# Patient Record
Sex: MALE | Race: WHITE | Employment: STUDENT | URBAN - METROPOLITAN AREA
[De-identification: names, ages, dates, MRNs, and addresses within clinical notes are randomized per-mention and may not be internally consistent; named-entity substitution may affect disease eponyms.]

---
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
## 2024-08-15 NOTE — PROGRESS NOTES
[facility]  Department of Family Medicine  Family Medicine Residency Program      Patient: Denise Draper 18 y.o. male     Date of Service: 8/15/24        Chief complaint:   Chief Complaint   Patient presents with    New Patient    Dizziness     Started on Monday- not feeling well this past week   Yesterday had SOB and Chest pain    Pharyngitis     X 1 day       HISTORY OF PRESENTING ILLNESS     18 y.o. male is a new patient with no PMHx who presents to the clinic to establish care. Today, they are also complaining of generalized illness.    Generalized illness  -4 days ago, got dizzy like he was going to pass out; does nto think it was chemicals because he wears a respirator; lasted 10 mintues  -He was shaking when this occurred, got anxious; left wor  -Vomited twice earlier this week; mucous/clear  -Wednesday slept most the da  -Yesterday had some chest pain, took Rajesh  -Has been SOB since  -Sore throat yesterday  -No F/C, eating okay, no diarrhea/constipation  -No dietary or workout regimens  -He has not traveled recently  -Unsure if he had sick contacts  -Tried some of ibuprofen and Rajesh, both were helpful  -No major stressors  -Sometimes wakes up to urinate at night    Health maintenance:  -Covid vaccine: No  -Flu vaccine: Yes  -Tdap: Believes he got it when he was 15  -Hep C screening: n/a  -HIV screening: n/a  -Lipid screening: n/a  -DM screening (A1c): n/a  -Depression screening: PHQ2 = 0    SUBS Screen (08/16/2024)  -No/low risk x4        Health Maintenance:  Health Maintenance Due   Topic Date Due    Depression Screen  Never done    Varicella vaccine (1 of 2 - 13+ 2-dose series) Never done    HPV vaccine (1 - Male 3-dose series) Never done    HIV screen  Never done    Hepatitis C screen  Never done    COVID-19 Vaccine (1 - 2023-24 season) Never done    Hepatitis B vaccine (1 of 3 - 19+ 3-dose series) Never done    DTaP/Tdap/Td vaccine (1 - Tdap) Never done    Flu vaccine (1)

## 2024-08-16 ENCOUNTER — OFFICE VISIT (OUTPATIENT)
Dept: FAMILY MEDICINE CLINIC | Age: 19
End: 2024-08-16

## 2024-08-16 VITALS
WEIGHT: 149.91 LBS | RESPIRATION RATE: 18 BRPM | HEIGHT: 69 IN | OXYGEN SATURATION: 98 % | TEMPERATURE: 98 F | BODY MASS INDEX: 22.2 KG/M2 | HEART RATE: 118 BPM | DIASTOLIC BLOOD PRESSURE: 83 MMHG | SYSTOLIC BLOOD PRESSURE: 127 MMHG

## 2024-08-16 DIAGNOSIS — J02.9 SORE THROAT: ICD-10-CM

## 2024-08-16 DIAGNOSIS — R42 DIZZINESS: Primary | ICD-10-CM

## 2024-08-16 DIAGNOSIS — J06.9 VIRAL URI: ICD-10-CM

## 2024-08-16 LAB
ALBUMIN: 4.1 G/DL (ref 3.5–5.2)
ALP BLD-CCNC: 76 U/L (ref 40–129)
ALT SERPL-CCNC: 10 U/L (ref 0–40)
ANION GAP SERPL CALCULATED.3IONS-SCNC: 10 MMOL/L (ref 7–16)
AST SERPL-CCNC: 21 U/L (ref 0–39)
BILIRUB SERPL-MCNC: 0.3 MG/DL (ref 0–1.2)
BUN BLDV-MCNC: 10 MG/DL (ref 6–20)
CALCIUM SERPL-MCNC: 9.2 MG/DL (ref 8.6–10.2)
CHLORIDE BLD-SCNC: 104 MMOL/L (ref 98–107)
CO2: 23 MMOL/L (ref 22–29)
CREAT SERPL-MCNC: 1 MG/DL (ref 0.7–1.2)
GFR, ESTIMATED: >90 ML/MIN/1.73M2
GLUCOSE BLD-MCNC: 74 MG/DL (ref 74–99)
HCT VFR BLD CALC: 48.4 % (ref 37–54)
HEMOGLOBIN: 14.6 G/DL (ref 12.5–16.5)
MCH RBC QN AUTO: 22 PG (ref 26–35)
MCHC RBC AUTO-ENTMCNC: 30.2 G/DL (ref 32–34.5)
MCV RBC AUTO: 72.9 FL (ref 80–99.9)
PDW BLD-RTO: 18 % (ref 11.5–15)
PLATELET, FLUORESCENCE: 176 K/UL (ref 130–450)
PMV BLD AUTO: ABNORMAL FL (ref 7–12)
POTASSIUM SERPL-SCNC: 4.3 MMOL/L (ref 3.5–5)
RBC # BLD: 6.64 M/UL (ref 3.8–5.8)
SODIUM BLD-SCNC: 137 MMOL/L (ref 132–146)
TOTAL PROTEIN: 7.4 G/DL (ref 6.4–8.3)
WBC # BLD: 4.6 K/UL (ref 4.5–11.5)

## 2024-08-16 SDOH — ECONOMIC STABILITY: FOOD INSECURITY: WITHIN THE PAST 12 MONTHS, THE FOOD YOU BOUGHT JUST DIDN'T LAST AND YOU DIDN'T HAVE MONEY TO GET MORE.: NEVER TRUE

## 2024-08-16 SDOH — ECONOMIC STABILITY: FOOD INSECURITY: WITHIN THE PAST 12 MONTHS, YOU WORRIED THAT YOUR FOOD WOULD RUN OUT BEFORE YOU GOT MONEY TO BUY MORE.: NEVER TRUE

## 2024-08-16 SDOH — ECONOMIC STABILITY: INCOME INSECURITY: HOW HARD IS IT FOR YOU TO PAY FOR THE VERY BASICS LIKE FOOD, HOUSING, MEDICAL CARE, AND HEATING?: NOT HARD AT ALL

## 2024-08-16 ASSESSMENT — PATIENT HEALTH QUESTIONNAIRE - PHQ9
SUM OF ALL RESPONSES TO PHQ QUESTIONS 1-9: 0
1. LITTLE INTEREST OR PLEASURE IN DOING THINGS: NOT AT ALL
SUM OF ALL RESPONSES TO PHQ QUESTIONS 1-9: 0
SUM OF ALL RESPONSES TO PHQ9 QUESTIONS 1 & 2: 0
SUM OF ALL RESPONSES TO PHQ QUESTIONS 1-9: 0
SUM OF ALL RESPONSES TO PHQ QUESTIONS 1-9: 0
2. FEELING DOWN, DEPRESSED OR HOPELESS: NOT AT ALL

## 2024-08-16 ASSESSMENT — ENCOUNTER SYMPTOMS
CONSTIPATION: 0
RHINORRHEA: 0
VOMITING: 0
COUGH: 0
NAUSEA: 0
SHORTNESS OF BREATH: 0
SORE THROAT: 1
DIARRHEA: 0
ABDOMINAL PAIN: 0

## 2024-08-16 NOTE — PROGRESS NOTES
[facility]  Precepting Note    Subjective:  New patient    1 week of dizziness on standing  Lasted about 10 minutes  Felt shaky but issue went away within 10 minutes  Progressed ot have emesis, myalgias, arthralgias   No fevers     Thinks had TdaP in HS  Declines HM issues otherwise     ROS otherwise negative     Past medical, surgical, family and social history were reviewed, non-contributory, and unchanged unless otherwise stated.    Objective:    /78   Pulse 93   Temp 98 °F (36.7 °C)   Resp 18   Ht 1.753 m (5' 9\")   Wt 68 kg (149 lb 14.6 oz)   SpO2 98%   BMI 22.14 kg/m²     Exam is as noted by resident with the following changes, additions or corrections:    General:  NAD; alert & oriented x 3   Eye: PEERL  ENT: TM's clear; oropharynx unremarkable   Heart:  RRR, no murmurs, gallops, or rubs.  Lungs:  CTA bilaterally, no wheeze, rales or rhonchi  Abd: bowel sounds present, nontender, nondistended, no masses  Extrem:  No clubbing, cyanosis, or edema    Assessment/Plan:  Viral syndrome   R/o strep   Check glucose   Follow clinically      Attending Physician Statement  I have reviewed the chart, including any radiology or labs. I have discussed the case, including pertinent history and exam findings with the resident.  I agree with the assessment, plan and orders as documented by the resident.  Please refer to the resident note for additional information.      Electronically signed by Eliseo Wang MD on 8/16/2024 at 9:11 AM

## 2024-08-16 NOTE — PROGRESS NOTES
This patient was screened with SUBS screening tool.   On 8/16/2024 the patient has a Negative Screen

## 2024-08-20 DIAGNOSIS — R71.8 RBC MICROCYTOSIS: Primary | ICD-10-CM

## 2024-09-13 ENCOUNTER — HOSPITAL ENCOUNTER (EMERGENCY)
Age: 19
Discharge: LEFT BEFORE DISCHARGE | End: 2024-09-13

## 2024-09-13 VITALS — HEART RATE: 105 BPM | TEMPERATURE: 97.7 F | RESPIRATION RATE: 18 BRPM | OXYGEN SATURATION: 97 %

## 2024-10-15 ENCOUNTER — HOSPITAL ENCOUNTER (OUTPATIENT)
Age: 19
Discharge: HOME | End: 2024-10-15
Payer: COMMERCIAL

## 2024-10-15 DIAGNOSIS — R71.8 RBC MICROCYTOSIS: ICD-10-CM

## 2024-10-15 LAB
ERYTHROCYTE [DISTWIDTH] IN BLOOD BY AUTOMATED COUNT: 15.6 % (ref 11.5–15)
FERRITIN SERPL-MCNC: 9 NG/ML
FOLATE SERPL-MCNC: 3.8 NG/ML (ref 4.8–24.2)
HCT VFR BLD AUTO: 49.4 % (ref 37–54)
HGB BLD-MCNC: 15.8 G/DL (ref 12.5–16.5)
IMM RETICS NFR: 13.5 % (ref 2.3–13.4)
IRON SATN MFR SERPL: 9 % (ref 20–55)
IRON SERPL-MCNC: 36 UG/DL (ref 59–158)
MCH RBC QN AUTO: 22.5 PG (ref 26–35)
MCHC RBC AUTO-ENTMCNC: 32 G/DL (ref 32–34.5)
MCV RBC AUTO: 70.4 FL (ref 80–99.9)
PLATELET # BLD AUTO: 224 K/UL (ref 130–450)
PMV BLD AUTO: 10.4 FL (ref 7–12)
RBC # BLD AUTO: 7.02 M/UL (ref 3.8–5.8)
RETIC HEMOGLOBIN: 26 PG (ref 28.2–36.6)
RETICS # AUTO: 0.09 M/UL
RETICS/RBC NFR AUTO: 1.4 % (ref 0.4–1.9)
TIBC SERPL-MCNC: 384 UG/DL (ref 250–450)
VIT B12 SERPL-MCNC: 970 PG/ML (ref 211–946)
WBC OTHER # BLD: 5.6 K/UL (ref 4.5–11.5)

## 2024-10-15 PROCEDURE — 85045 AUTOMATED RETICULOCYTE COUNT: CPT

## 2024-10-15 PROCEDURE — 85027 COMPLETE CBC AUTOMATED: CPT

## 2024-10-15 PROCEDURE — 83550 IRON BINDING TEST: CPT

## 2024-10-15 PROCEDURE — 83540 ASSAY OF IRON: CPT

## 2024-10-15 PROCEDURE — 83020 HEMOGLOBIN ELECTROPHORESIS: CPT

## 2024-10-15 PROCEDURE — 36415 COLL VENOUS BLD VENIPUNCTURE: CPT

## 2024-10-15 PROCEDURE — 82728 ASSAY OF FERRITIN: CPT

## 2024-10-15 PROCEDURE — 82607 VITAMIN B-12: CPT

## 2024-10-15 PROCEDURE — 82746 ASSAY OF FOLIC ACID SERUM: CPT

## 2024-10-16 DIAGNOSIS — D50.9 MICROCYTIC ANEMIA: Primary | ICD-10-CM

## 2024-10-16 LAB — PATH REV BLD -IMP: NORMAL

## 2024-10-16 RX ORDER — ASCORBIC ACID 500 MG
500 TABLET ORAL DAILY
Qty: 30 TABLET | Refills: 3 | Status: SHIPPED | OUTPATIENT
Start: 2024-10-16

## 2024-10-16 RX ORDER — FERROUS SULFATE 325(65) MG
325 TABLET ORAL
Qty: 90 TABLET | Refills: 1 | Status: SHIPPED | OUTPATIENT
Start: 2024-10-16

## 2024-10-17 ENCOUNTER — TELEPHONE (OUTPATIENT)
Dept: INFUSION THERAPY | Age: 19
End: 2024-10-17

## 2024-10-21 LAB
HGB ELECTROPHORESIS INTERP: NORMAL
PATHOLOGIST: NORMAL

## 2024-11-08 ENCOUNTER — OFFICE VISIT (OUTPATIENT)
Dept: ONCOLOGY | Age: 19
End: 2024-11-08

## 2024-11-08 ENCOUNTER — HOSPITAL ENCOUNTER (OUTPATIENT)
Dept: INFUSION THERAPY | Age: 19
Discharge: HOME | End: 2024-11-08
Payer: COMMERCIAL

## 2024-11-08 VITALS
HEART RATE: 73 BPM | WEIGHT: 150.5 LBS | TEMPERATURE: 97.9 F | DIASTOLIC BLOOD PRESSURE: 78 MMHG | SYSTOLIC BLOOD PRESSURE: 124 MMHG | RESPIRATION RATE: 20 BRPM | OXYGEN SATURATION: 98 % | BODY MASS INDEX: 22.22 KG/M2

## 2024-11-08 DIAGNOSIS — D50.8 OTHER IRON DEFICIENCY ANEMIA: ICD-10-CM

## 2024-11-08 DIAGNOSIS — R71.8 MICROCYTOSIS: ICD-10-CM

## 2024-11-08 DIAGNOSIS — D50.8 OTHER IRON DEFICIENCY ANEMIA: Primary | ICD-10-CM

## 2024-11-08 DIAGNOSIS — J35.1 ENLARGED TONSILS: ICD-10-CM

## 2024-11-08 LAB
ALBUMIN SERPL-MCNC: 4.5 G/DL (ref 3.5–5.2)
ALP SERPL-CCNC: 90 U/L (ref 40–129)
ALT SERPL-CCNC: 16 U/L (ref 0–40)
ANION GAP SERPL CALCULATED.3IONS-SCNC: 9 MMOL/L (ref 7–16)
AST SERPL-CCNC: 27 U/L (ref 0–39)
BASOPHILS # BLD: 0.04 K/UL (ref 0–0.2)
BASOPHILS NFR BLD: 1 % (ref 0–2)
BILIRUB SERPL-MCNC: 0.8 MG/DL (ref 0–1.2)
BUN SERPL-MCNC: 14 MG/DL (ref 6–20)
CALCIUM SERPL-MCNC: 9.5 MG/DL (ref 8.6–10.2)
CHLORIDE SERPL-SCNC: 105 MMOL/L (ref 98–107)
CO2 SERPL-SCNC: 27 MMOL/L (ref 22–29)
CREAT SERPL-MCNC: 0.9 MG/DL (ref 0.7–1.2)
EOSINOPHIL # BLD: 0.18 K/UL (ref 0.05–0.5)
EOSINOPHILS RELATIVE PERCENT: 5 % (ref 0–6)
ERYTHROCYTE [DISTWIDTH] IN BLOOD BY AUTOMATED COUNT: 17.6 % (ref 11.5–15)
FERRITIN SERPL-MCNC: 30 NG/ML
FOLATE SERPL-MCNC: 10.4 NG/ML (ref 4.8–24.2)
GFR, ESTIMATED: >90 ML/MIN/1.73M2
GLIADIN IGA SER IA-ACNC: NORMAL U/ML
GLIADIN IGG SER IA-ACNC: NORMAL U/ML
GLUCOSE SERPL-MCNC: 62 MG/DL (ref 74–99)
HCT VFR BLD AUTO: 51 % (ref 37–54)
HGB BLD-MCNC: 16.2 G/DL (ref 12.5–16.5)
IGA SERPL-MCNC: 273 MG/DL (ref 70–400)
IMM GRANULOCYTES # BLD AUTO: <0.03 K/UL (ref 0–0.58)
IMM GRANULOCYTES NFR BLD: 0 % (ref 0–5)
IRON SATN MFR SERPL: 16 % (ref 20–55)
IRON SERPL-MCNC: 66 UG/DL (ref 59–158)
LYMPHOCYTES NFR BLD: 1.41 K/UL (ref 1.5–4)
LYMPHOCYTES RELATIVE PERCENT: 39 % (ref 20–42)
MCH RBC QN AUTO: 23.5 PG (ref 26–35)
MCHC RBC AUTO-ENTMCNC: 31.8 G/DL (ref 32–34.5)
MCV RBC AUTO: 74.1 FL (ref 80–99.9)
MONOCYTES NFR BLD: 0.34 K/UL (ref 0.1–0.95)
MONOCYTES NFR BLD: 9 % (ref 2–12)
NEUTROPHILS NFR BLD: 46 % (ref 43–80)
NEUTS SEG NFR BLD: 1.66 K/UL (ref 1.8–7.3)
PLATELET # BLD AUTO: 217 K/UL (ref 130–450)
PMV BLD AUTO: 10.3 FL (ref 7–12)
POTASSIUM SERPL-SCNC: 3.9 MMOL/L (ref 3.5–5)
PROT SERPL-MCNC: 7.5 G/DL (ref 6.4–8.3)
RBC # BLD AUTO: 6.88 M/UL (ref 3.8–5.8)
SODIUM SERPL-SCNC: 141 MMOL/L (ref 132–146)
TIBC SERPL-MCNC: 415 UG/DL (ref 250–450)
TTG IGA SER IA-ACNC: NORMAL U/ML
VIT B12 SERPL-MCNC: 1217 PG/ML (ref 211–946)
WBC OTHER # BLD: 3.6 K/UL (ref 4.5–11.5)

## 2024-11-08 PROCEDURE — 36415 COLL VENOUS BLD VENIPUNCTURE: CPT

## 2024-11-08 PROCEDURE — 82746 ASSAY OF FOLIC ACID SERUM: CPT

## 2024-11-08 PROCEDURE — 80053 COMPREHEN METABOLIC PANEL: CPT

## 2024-11-08 PROCEDURE — 82728 ASSAY OF FERRITIN: CPT

## 2024-11-08 PROCEDURE — 83516 IMMUNOASSAY NONANTIBODY: CPT

## 2024-11-08 PROCEDURE — 83540 ASSAY OF IRON: CPT

## 2024-11-08 PROCEDURE — 85025 COMPLETE CBC W/AUTO DIFF WBC: CPT

## 2024-11-08 PROCEDURE — 82607 VITAMIN B-12: CPT

## 2024-11-08 PROCEDURE — 82668 ASSAY OF ERYTHROPOIETIN: CPT

## 2024-11-08 PROCEDURE — 82784 ASSAY IGA/IGD/IGG/IGM EACH: CPT

## 2024-11-08 PROCEDURE — 83550 IRON BINDING TEST: CPT

## 2024-11-08 NOTE — PROGRESS NOTES
Denise Draper  2005 19 y.o.      Referring Physician:     PCP: Tee Carr MD    Vitals:    24 1011   BP: 124/78   Pulse: 73   Resp: 20   Temp: 97.9 °F (36.6 °C)   SpO2: 98%        Wt Readings from Last 3 Encounters:   24 68.3 kg (150 lb 8 oz)   24 68 kg (149 lb 14.6 oz) (41%, Z= -0.22)*   04/15/22 65.8 kg (145 lb) (49%, Z= -0.02)*     * Growth percentiles are based on [facility] (Boys, 2-20 Years) data.        Body mass index is 22.22 kg/m².          Chief Complaint:   Chief Complaint   Patient presents with    New Patient     Microcytic Anemia          Cancer Staging   No matching staging information was found for the patient.      Prior Radiation Therapy? NO    Concurrent Chemo/radiation? NO    Prior Chemotherapy? NO    Prior Hormonal Therapy? NO    Head and Neck Cancer? No, patient does NOT have HN cancer.      LMP: na    Age at first Menses: na    : na    Para: na          Current Outpatient Medications:     ferrous sulfate (IRON 325) 325 (65 Fe) MG tablet, Take 1 tablet by mouth daily (with breakfast) (Patient not taking: Reported on 2024), Disp: 90 tablet, Rfl: 1    vitamin C (ASCORBIC ACID) 500 MG tablet, Take 1 tablet by mouth daily (Patient not taking: Reported on 2024), Disp: 30 tablet, Rfl: 3     History reviewed. No pertinent past medical history.    Past Surgical History:   Procedure Laterality Date    APPENDECTOMY      DENTAL SURGERY  2013    FILLINGS    EXPLORATION OF UNDESCENDED TESTICLE      FRACTURE SURGERY      TYMPANOSTOMY TUBE PLACEMENT         History reviewed. No pertinent family history.    Social History     Socioeconomic History    Marital status: Single     Spouse name: Not on file    Number of children: Not on file    Years of education: Not on file    Highest education level: Not on file   Occupational History    Not on file   Tobacco Use    Smoking status: Never     Passive exposure: Never    Smokeless tobacco: Never   Substance and Sexual 
distended.   No ascites.  No hepatosplenomegaly.  EXTREMITIES: without clubbing, cyanosis, or edema.  NEUROLOGIC:  Alert, awake, oriented to time, place and person. No focal deficits.  LYMPHATICS: No cervical  lymphadenopathy.  SKIN : No Rash. No Bruising.  PSYCH: Normal mood, behavior, affect, judgement, and thought content.    ECOG PS 0      No results found.         ASSESSMENT      Elevated RBC with microcytosis, without anemia  Iron deficiency likely associate above  Folate deficiency  Morning headaches  Anxiety attacks  Enlarged tonsils      PLAN     11/08/2024: Comes in for evaluation for microcytosis.  He also has elevated RBC without anemia.  Today we had discussion regarding different causes of this.  Hemoglobin electrophoresis was checked, which was largely unimpressive.  He does have evidence of iron deficiency.  He denies of any bleeding.  Consider possible issues with diet, as he and his mother admit that he is a \"picky eater.\"  Patient does not have overt signs or symptoms suggestive of celiac disease, but I did offer screening for this.    As there is evidence of iron deficiency which appears to be significant, consider the this as likely contributory to his microcytosis.  And I did address that he may benefit from GI workup, as this degree of iron deficiency is significant for his age and gender.    Secondly regarding his elevated RBC and normal hemoglobin, consider that he may also have an underlying secondary polycythemia.  History and examination seems to suggest that he may have underlying GREGORIA.  He is unsure if he snores.  However he wakes up with headaches that improves during the day.  Oropharyngeal examination notes enlarged tonsils, in which she had seen the [facility] in the past but was not recommended any interventions.  He appears to be Mallampati 4.  Patient does not smoke. Patient and mother wish to be seen by ENT again.  Consider while his iron is being replaced, this may result into

## 2024-11-11 LAB
EPO SERPL-ACNC: 10 MU/ML (ref 4–27)
GLIADIN IGA SER IA-ACNC: 3.1 U/ML
GLIADIN IGG SER IA-ACNC: 1.1 U/ML
IGA SERPL-MCNC: 273 MG/DL (ref 70–400)
TTG IGA SER IA-ACNC: 0.6 U/ML

## 2024-11-22 ENCOUNTER — OFFICE VISIT (OUTPATIENT)
Dept: ONCOLOGY | Age: 19
End: 2024-11-22
Payer: COMMERCIAL

## 2024-11-22 ENCOUNTER — TELEPHONE (OUTPATIENT)
Dept: INFUSION THERAPY | Age: 19
End: 2024-11-22

## 2024-11-22 VITALS
HEART RATE: 80 BPM | BODY MASS INDEX: 22.84 KG/M2 | WEIGHT: 154.2 LBS | OXYGEN SATURATION: 100 % | TEMPERATURE: 99.1 F | HEIGHT: 69 IN | DIASTOLIC BLOOD PRESSURE: 75 MMHG | SYSTOLIC BLOOD PRESSURE: 129 MMHG | RESPIRATION RATE: 16 BRPM

## 2024-11-22 DIAGNOSIS — D50.8 OTHER IRON DEFICIENCY ANEMIA: Primary | ICD-10-CM

## 2024-11-22 DIAGNOSIS — E53.8 FOLATE DEFICIENCY: ICD-10-CM

## 2024-11-22 PROCEDURE — 99213 OFFICE O/P EST LOW 20 MIN: CPT | Performed by: STUDENT IN AN ORGANIZED HEALTH CARE EDUCATION/TRAINING PROGRAM

## 2024-11-22 NOTE — TELEPHONE ENCOUNTER
Spoke with patient's mother, Yen. Said RN made Yen aware that Dr. Rahman's office has reached out to schedule appt. They are waiting on return call. Dr. Rahman's office number given. Yen verbalized understanding and will call Josiha's office to schedule appt.  Ileana Shrestha, BSN, RN, OCN 11/22/24 [id number]

## 2024-11-22 NOTE — PROGRESS NOTES
[facility] MEDICAL ONCOLOGY  [address] [facility] [place] [address]  Dept: [phone]  Loc: [phone]    Sudha Pham MD   ·   MD Josephine Kaye APRN  ·  DIXON Santos        Hematology/Oncology   Clinic Progress Note              Patient: Denise Draper,  19 y.o. male    Date of Encounter: 11/22/2024     Referring Provider:  Parveen Kunz MD    Reason for Visit:     Diagnosis   D50.9 (ICD-10-CM) - Microcytic anemia           PCP:  Tee Carr MD      Chief Complaint   Patient presents with    Follow-up         Subjective:  No major issues; here to review workup.  Has reflux, which has been ongoing even before taking PO iron.      HPI from Initial Outpatient Consultation  (11/8/2024):  The patient is a 19 y.o. male comes in for evaluation for findings of microcytosis, elevated RBC with normal hemoglobin.    Patient has been worked up by PCP, which included iron studies suggestive of iron deficiency.  He also has evidence of folate deficiency.  However his hemoglobin has largely normal over recent months.  However his RBC count has been elevated at least >6, and MCV has been low, ranging from 65-72.9.    Patient denies of unintended weight loss, abdominal pain, nausea, vomiting, melena/hematochezia.  He denies family history of GI cancers.  He is accompanied by his mother today.  She describes patient being a \"picky eater\", and patient admits that he eats lots of \"junk foods.\"  Patient and his mother are unaware of family history of hematologic disorders or inherited anemias.  Patient does not have any siblings.    He does not smoke, drink alcohol, or use illicit drugs.    Patient reportedly complains of having anxiety/panic attacks over the last few months.  He also noted that he would wake up with headaches, which improved during the day.  He denies of snoring, or any diagnosis of sleep apnea.    He does report having enlarged tonsils.

## 2024-12-24 ENCOUNTER — HOSPITAL ENCOUNTER (EMERGENCY)
Age: 19
Discharge: HOME | End: 2024-12-24
Attending: EMERGENCY MEDICINE
Payer: COMMERCIAL

## 2024-12-24 ENCOUNTER — APPOINTMENT (OUTPATIENT)
Dept: GENERAL RADIOLOGY | Age: 19
End: 2024-12-24
Payer: COMMERCIAL

## 2024-12-24 VITALS
HEART RATE: 89 BPM | OXYGEN SATURATION: 98 % | DIASTOLIC BLOOD PRESSURE: 71 MMHG | RESPIRATION RATE: 20 BRPM | SYSTOLIC BLOOD PRESSURE: 129 MMHG | TEMPERATURE: 97.4 F

## 2024-12-24 DIAGNOSIS — R07.9 CHEST PAIN, UNSPECIFIED TYPE: Primary | ICD-10-CM

## 2024-12-24 PROCEDURE — 71045 X-RAY EXAM CHEST 1 VIEW: CPT

## 2024-12-24 PROCEDURE — 99284 EMERGENCY DEPT VISIT MOD MDM: CPT

## 2024-12-24 ASSESSMENT — LIFESTYLE VARIABLES
HOW MANY STANDARD DRINKS CONTAINING ALCOHOL DO YOU HAVE ON A TYPICAL DAY: PATIENT DOES NOT DRINK
HOW OFTEN DO YOU HAVE A DRINK CONTAINING ALCOHOL: NEVER

## 2024-12-25 NOTE — DISCHARGE INSTRUCTIONS
Return if increased chest pain shortness of breath heart racing weakness lightheadedness leg swelling or calf pain

## 2024-12-25 NOTE — ED PROVIDER NOTES
personally reviewed by myself   LABS:  Results for orders placed or performed during the hospital encounter of 12/24/24   EKG 12 Lead   Result Value Ref Range    Ventricular Rate 88 BPM    Atrial Rate 88 BPM    P-R Interval 120 ms    QRS Duration 92 ms    Q-T Interval 342 ms    QTc Calculation (Bazett) 413 ms    P Axis 62 degrees    R Axis 57 degrees    T Axis 48 degrees       RADIOLOGY:  Interpreted by Radiologist.  XR CHEST 1 VIEW    (Results Pending)       ------------------------- NURSING NOTES AND VITALS REVIEWED ---------------------------   The nursing notes within the ED encounter and vital signs as below have been reviewed.   /71   Pulse 89   Temp 97.4 °F (36.3 °C) (Axillary)   Resp 20   SpO2 98%   Oxygen Saturation Interpretation: Normal      ---------------------------------------------------PHYSICAL EXAM--------------------------------------    Constitutional/General: Alert and oriented x3, well appearing, non toxic in NAD  Head: NC/AT  Eyes: PERRL, EOMI  Mouth: Oropharynx clear, handling secretions, no trismus  Neck: Supple, full ROM, no meningeal signs  Pulmonary: Lungs clear to auscultation bilaterally, no wheezes, rales, or rhonchi. Not in respiratory distress  Cardiovascular:  Regular rate and rhythm, no murmurs, gallops, or rubs. 2+ distal pulses  Abdomen: Soft, non tender, non distended,   Extremities: Moves all extremities x 4. Warm and well perfused  Skin: warm and dry without rash  Neurologic: GCS 15,  Psych: Normal Affect      ------------------------------ ED COURSE/MEDICAL DECISION MAKING----------------------  Medications - No data to display      Medical Decision Making:       Denise Draper is a 19 y.o. male presenting to the ED for nonspecific sharp chest pain did not was not persistent he was not short of breath with it no nausea vomiting no diaphoresis no palpitations no heart racing no lightheadedness no syncopal episode no nausea or vomiting that he was never short of

## 2024-12-28 LAB
EKG ATRIAL RATE: 88 BPM
EKG P AXIS: 62 DEGREES
EKG P-R INTERVAL: 120 MS
EKG Q-T INTERVAL: 342 MS
EKG QRS DURATION: 92 MS
EKG QTC CALCULATION (BAZETT): 413 MS
EKG R AXIS: 57 DEGREES
EKG T AXIS: 48 DEGREES
EKG VENTRICULAR RATE: 88 BPM

## 2025-01-24 ENCOUNTER — OFFICE VISIT (OUTPATIENT)
Dept: ENT CLINIC | Age: 20
End: 2025-01-24
Payer: COMMERCIAL

## 2025-01-24 VITALS
HEART RATE: 88 BPM | SYSTOLIC BLOOD PRESSURE: 136 MMHG | BODY MASS INDEX: 23 KG/M2 | WEIGHT: 155.3 LBS | DIASTOLIC BLOOD PRESSURE: 85 MMHG | HEIGHT: 69 IN

## 2025-01-24 DIAGNOSIS — J35.1 TONSILLAR HYPERTROPHY: Primary | ICD-10-CM

## 2025-01-24 PROCEDURE — 99203 OFFICE O/P NEW LOW 30 MIN: CPT | Performed by: OTOLARYNGOLOGY

## 2025-01-24 ASSESSMENT — ENCOUNTER SYMPTOMS
STRIDOR: 0
RHINORRHEA: 0
WHEEZING: 0
SINUS PRESSURE: 0
COUGH: 0
SHORTNESS OF BREATH: 0
SINUS PAIN: 0
CHOKING: 0
SORE THROAT: 0

## 2025-01-24 NOTE — PROGRESS NOTES
Department of Otolaryngology  Office Consult Note  1/24/25          Subjective:        Chief Complaint:  had concerns including New Patient (NEW PATIENT - NP enlarge tonsil/).     Patient ID: Denise Draper is a 19 y.o. male.    HPI: Patient presents as  new patient for enlarged tonsils and gag reflex. Patient reports he has a very sensitive gag reflex that is worsened by swallowing pills and also touching the outside of his chin and neck.  This has been going on for the past few months to years.  Nothing makes it better or worse.  He has been noted to have enlarged tonsils.  He denies history of recurrent strep tonsillitis.  He states he does have tiredness but is not sure that he snores, has apnea .      Review of Systems   Constitutional: Negative.    HENT:  Negative for congestion, ear discharge, ear pain, hearing loss, rhinorrhea, sinus pressure, sinus pain, sore throat and tinnitus.    Respiratory:  Negative for cough, choking, shortness of breath, wheezing and stridor.    Cardiovascular:  Negative for chest pain.   Skin:  Negative for rash.   Allergic/Immunologic: Negative for environmental allergies and immunocompromised state.   Neurological:  Negative for dizziness, weakness, light-headedness and headaches.   Psychiatric/Behavioral:  Negative for confusion.    All other systems reviewed and are negative.        Past Medical History:   Diagnosis Date    Anemia      Past Surgical History:   Procedure Laterality Date    APPENDECTOMY      ARM SURGERY Right     DENTAL SURGERY  05/08/2013    FILLINGS    EXPLORATION OF UNDESCENDED TESTICLE      FRACTURE SURGERY      TYMPANOSTOMY TUBE PLACEMENT         Current Outpatient Medications:     Ascorbic Acid (VITAMIN C GUMMIE PO), Take by mouth, Disp: , Rfl:     ferrous sulfate (IRON 325) 325 (65 Fe) MG tablet, Take 1 tablet by mouth daily (with breakfast) (Patient taking differently: Take 1 tablet by mouth daily (with breakfast) Taking iron gummie PO), Disp: 90

## 2025-03-20 ENCOUNTER — HOSPITAL ENCOUNTER (OUTPATIENT)
Age: 20
Discharge: HOME | End: 2025-03-20
Payer: COMMERCIAL

## 2025-03-20 DIAGNOSIS — D50.8 OTHER IRON DEFICIENCY ANEMIA: ICD-10-CM

## 2025-03-20 DIAGNOSIS — E53.8 FOLATE DEFICIENCY: ICD-10-CM

## 2025-03-20 LAB
ALBUMIN SERPL-MCNC: 4.3 G/DL (ref 3.5–5.2)
ALP SERPL-CCNC: 91 U/L (ref 40–129)
ALT SERPL-CCNC: 16 U/L (ref 0–40)
ANION GAP SERPL CALCULATED.3IONS-SCNC: 11 MMOL/L (ref 7–16)
AST SERPL-CCNC: 24 U/L (ref 0–39)
BASOPHILS # BLD: 0.02 K/UL (ref 0–0.2)
BASOPHILS NFR BLD: 0 % (ref 0–2)
BILIRUB SERPL-MCNC: 0.6 MG/DL (ref 0–1.2)
BUN SERPL-MCNC: 16 MG/DL (ref 6–20)
CALCIUM SERPL-MCNC: 9.7 MG/DL (ref 8.6–10.2)
CHLORIDE SERPL-SCNC: 101 MMOL/L (ref 98–107)
CO2 SERPL-SCNC: 26 MMOL/L (ref 22–29)
CREAT SERPL-MCNC: 0.8 MG/DL (ref 0.7–1.2)
EOSINOPHIL # BLD: 0.18 K/UL (ref 0.05–0.5)
EOSINOPHILS RELATIVE PERCENT: 3 % (ref 0–6)
ERYTHROCYTE [DISTWIDTH] IN BLOOD BY AUTOMATED COUNT: 14.7 % (ref 11.5–15)
FERRITIN SERPL-MCNC: 14 NG/ML
FOLATE SERPL-MCNC: 6.1 NG/ML (ref 4.8–24.2)
GFR, ESTIMATED: >90 ML/MIN/1.73M2
GLUCOSE SERPL-MCNC: 86 MG/DL (ref 74–99)
HCT VFR BLD AUTO: 48.7 % (ref 37–54)
HGB BLD-MCNC: 15.8 G/DL (ref 12.5–16.5)
IMM GRANULOCYTES # BLD AUTO: 0.03 K/UL (ref 0–0.58)
IMM GRANULOCYTES NFR BLD: 1 % (ref 0–5)
IRON SATN MFR SERPL: 17 % (ref 20–55)
IRON SERPL-MCNC: 59 UG/DL (ref 59–158)
LYMPHOCYTES NFR BLD: 1.62 K/UL (ref 1.5–4)
LYMPHOCYTES RELATIVE PERCENT: 30 % (ref 20–42)
MCH RBC QN AUTO: 23.6 PG (ref 26–35)
MCHC RBC AUTO-ENTMCNC: 32.4 G/DL (ref 32–34.5)
MCV RBC AUTO: 72.7 FL (ref 80–99.9)
MONOCYTES NFR BLD: 0.32 K/UL (ref 0.1–0.95)
MONOCYTES NFR BLD: 6 % (ref 2–12)
NEUTROPHILS NFR BLD: 60 % (ref 43–80)
NEUTS SEG NFR BLD: 3.21 K/UL (ref 1.8–7.3)
PLATELET # BLD AUTO: 185 K/UL (ref 130–450)
PMV BLD AUTO: 9.8 FL (ref 7–12)
POTASSIUM SERPL-SCNC: 4 MMOL/L (ref 3.5–5)
PROT SERPL-MCNC: 7.6 G/DL (ref 6.4–8.3)
RBC # BLD AUTO: 6.7 M/UL (ref 3.8–5.8)
SODIUM SERPL-SCNC: 138 MMOL/L (ref 132–146)
TIBC SERPL-MCNC: 354 UG/DL (ref 250–450)
VIT B12 SERPL-MCNC: 910 PG/ML (ref 211–946)
WBC OTHER # BLD: 5.4 K/UL (ref 4.5–11.5)

## 2025-03-20 PROCEDURE — 83540 ASSAY OF IRON: CPT

## 2025-03-20 PROCEDURE — 36415 COLL VENOUS BLD VENIPUNCTURE: CPT

## 2025-03-20 PROCEDURE — 85025 COMPLETE CBC W/AUTO DIFF WBC: CPT

## 2025-03-20 PROCEDURE — 80053 COMPREHEN METABOLIC PANEL: CPT

## 2025-03-20 PROCEDURE — 82746 ASSAY OF FOLIC ACID SERUM: CPT

## 2025-03-20 PROCEDURE — 82728 ASSAY OF FERRITIN: CPT

## 2025-03-20 PROCEDURE — 82607 VITAMIN B-12: CPT

## 2025-03-20 PROCEDURE — 83550 IRON BINDING TEST: CPT

## 2025-03-21 ENCOUNTER — OFFICE VISIT (OUTPATIENT)
Dept: ONCOLOGY | Age: 20
End: 2025-03-21
Payer: COMMERCIAL

## 2025-03-21 VITALS
SYSTOLIC BLOOD PRESSURE: 158 MMHG | DIASTOLIC BLOOD PRESSURE: 80 MMHG | BODY MASS INDEX: 23.47 KG/M2 | TEMPERATURE: 98.8 F | HEIGHT: 69 IN | HEART RATE: 79 BPM | WEIGHT: 158.5 LBS | OXYGEN SATURATION: 99 %

## 2025-03-21 DIAGNOSIS — D50.8 OTHER IRON DEFICIENCY ANEMIA: Primary | ICD-10-CM

## 2025-03-21 PROCEDURE — 99213 OFFICE O/P EST LOW 20 MIN: CPT | Performed by: STUDENT IN AN ORGANIZED HEALTH CARE EDUCATION/TRAINING PROGRAM

## 2025-03-21 NOTE — PROGRESS NOTES
referrals  On multivitamin which includes iron and folate  Offered consideration for IV iron infusion  Consider referral to sleep medicine  Will await initial workup and response to PO iron and multivitamin; and if there is concern for primary hematology pathology, consider more intensive workup    11/22/2024:   Scheduled to see ENT in the next few months  Awaiting contact from GI to schedule appt  Ferritin and folate up while on supplement  Consider low iron and folate levels likely due poor nutritional due to his eating habits  With presence of tonsillar hypertrophy, consider celiac, although he does not have classic signs/symptoms of celiacs  Has reflux notably at night; pt regularly eats fried foods (ex., fried chicken, chicken nuggets)  Encouraged pt to cut down on fried foods and increase various meats, fruits and vegetables  I noted I would anticipate his Hgb/Hct may exceed normal limits in the months ahead as we replete his iron levels     03/21/2025:   Completed EGD last month  Found ulcer of distal esophagus, biopsies done in stomach as well  Will request pathology from Dr. Rahman's office  Still appears to be iron deficient but pt started on PPI  Offered IV iron but pt would like to see how he does on PO iron for now  Seen by ENT recently        Orders Placed This Encounter   Procedures    CBC with Auto Differential    Comprehensive Metabolic Panel    Iron and TIBC    Ferritin       DISPO:   Return in about 3 months (around 6/21/2025) for office visit and labs 1-3 days prior.      Thank you for allowing us to participate in the care of Denise Draper       Approximately spent 28 minutes on chart review as well as time spent on patient encounter, discussing the laboratory, imaging, and clinical findings; and documentation. I have discussed clinical implications and recommendations on the patient's primary issues. More than 50% of time was spent counseling patient. The patient verbalized

## 2025-07-22 ENCOUNTER — HOSPITAL ENCOUNTER (EMERGENCY)
Age: 20
Discharge: HOME | End: 2025-07-22
Payer: COMMERCIAL

## 2025-07-22 ENCOUNTER — APPOINTMENT (OUTPATIENT)
Dept: GENERAL RADIOLOGY | Age: 20
End: 2025-07-22
Payer: COMMERCIAL

## 2025-07-22 VITALS
DIASTOLIC BLOOD PRESSURE: 61 MMHG | BODY MASS INDEX: 22.89 KG/M2 | SYSTOLIC BLOOD PRESSURE: 133 MMHG | OXYGEN SATURATION: 97 % | HEART RATE: 91 BPM | RESPIRATION RATE: 16 BRPM | WEIGHT: 155 LBS | TEMPERATURE: 97.2 F

## 2025-07-22 DIAGNOSIS — R07.81 RIB PAIN ON RIGHT SIDE: Primary | ICD-10-CM

## 2025-07-22 PROCEDURE — 99283 EMERGENCY DEPT VISIT LOW MDM: CPT

## 2025-07-22 PROCEDURE — 71101 X-RAY EXAM UNILAT RIBS/CHEST: CPT

## 2025-07-22 PROCEDURE — 6370000000 HC RX 637 (ALT 250 FOR IP): Performed by: NURSE PRACTITIONER

## 2025-07-22 RX ORDER — IBUPROFEN 600 MG/1
600 TABLET, FILM COATED ORAL 3 TIMES DAILY PRN
Qty: 30 TABLET | Refills: 0 | Status: SHIPPED | OUTPATIENT
Start: 2025-07-22

## 2025-07-22 RX ORDER — IBUPROFEN 400 MG/1
400 TABLET, FILM COATED ORAL ONCE
Status: COMPLETED | OUTPATIENT
Start: 2025-07-22 | End: 2025-07-22

## 2025-07-22 RX ORDER — FAMOTIDINE 20 MG/1
20 TABLET, FILM COATED ORAL 2 TIMES DAILY
COMMUNITY

## 2025-07-22 RX ADMIN — IBUPROFEN 400 MG: 400 TABLET, FILM COATED ORAL at 18:24

## 2025-07-22 ASSESSMENT — PAIN - FUNCTIONAL ASSESSMENT
PAIN_FUNCTIONAL_ASSESSMENT: PREVENTS OR INTERFERES SOME ACTIVE ACTIVITIES AND ADLS
PAIN_FUNCTIONAL_ASSESSMENT: 0-10

## 2025-07-22 ASSESSMENT — PAIN SCALES - GENERAL
PAINLEVEL_OUTOF10: 5
PAINLEVEL_OUTOF10: 0

## 2025-07-22 ASSESSMENT — PAIN DESCRIPTION - LOCATION
LOCATION: RIB CAGE
LOCATION: CHEST

## 2025-07-22 ASSESSMENT — PAIN DESCRIPTION - DESCRIPTORS: DESCRIPTORS: DISCOMFORT;SHARP

## 2025-07-22 ASSESSMENT — PAIN DESCRIPTION - PAIN TYPE: TYPE: ACUTE PAIN

## 2025-07-22 ASSESSMENT — PAIN DESCRIPTION - ORIENTATION: ORIENTATION: RIGHT

## 2025-07-22 ASSESSMENT — PAIN DESCRIPTION - FREQUENCY: FREQUENCY: INTERMITTENT

## 2025-07-22 NOTE — ED PROVIDER NOTES
Independent ROSA Visit.       [facility] EMERGENCY DEPARTMENT  EMERGENCY DEPARTMENT ENCOUNTER      Pt Name: Denise Draper  MRN: [id number]  Birthdate 2005  Date of evaluation: 7/22/2025  Provider: DIXON Martin CNP  7:44 PM    CHIEF COMPLAINT       Chief Complaint   Patient presents with    Rib Pain     Intermittent pain in right ribs today. Moving right arm across chest causes the pain.         HISTORY OF PRESENT ILLNESS    Denise Draper is a 19 y.o. male who presents to the emergency department for rib pain.  Patient states that the pain is intermittent and with certain movements.  Patient states that he does do physically demanding job.  States that he does not lift but he does pool and removed.  Patient states that he has had no chest pain no shortness of breath.  States that he took aspirin at 4 AM but the pain started around 1 PM after he woke up from a nap.  Patient states that there has been no hemoptysis.  Has not taken anything else for his symptoms.  Patient states that at this time he has no pain at all and is only when he pushes on the right lateral ribs.    The history is provided by the patient and a significant other.       Nursing Notes were reviewed.    REVIEW OF SYSTEMS       Review of Systems    Except as noted above the remainder of the review of systems was reviewed and negative.       PAST MEDICAL HISTORY     Past Medical History:   Diagnosis Date    Anemia          SURGICAL HISTORY       Past Surgical History:   Procedure Laterality Date    APPENDECTOMY      ARM SURGERY Right     DENTAL SURGERY  05/08/2013    FILLINGS    EXPLORATION OF UNDESCENDED TESTICLE      FRACTURE SURGERY      TYMPANOSTOMY TUBE PLACEMENT           CURRENT MEDICATIONS       Discharge Medication List as of 7/22/2025  6:21 PM        CONTINUE these medications which have NOT CHANGED    Details   famotidine (PEPCID) 20 MG tablet Take 1 tablet by mouth 2 times dailyHistorical Med

## 2025-08-10 ENCOUNTER — HOSPITAL ENCOUNTER (EMERGENCY)
Age: 20
Discharge: HOME | End: 2025-08-10
Attending: STUDENT IN AN ORGANIZED HEALTH CARE EDUCATION/TRAINING PROGRAM
Payer: COMMERCIAL

## 2025-08-10 VITALS
SYSTOLIC BLOOD PRESSURE: 142 MMHG | BODY MASS INDEX: 22.89 KG/M2 | DIASTOLIC BLOOD PRESSURE: 81 MMHG | TEMPERATURE: 98.1 F | RESPIRATION RATE: 18 BRPM | HEART RATE: 80 BPM | WEIGHT: 155 LBS | OXYGEN SATURATION: 99 %

## 2025-08-10 DIAGNOSIS — L03.012 PARONYCHIA OF FINGER OF LEFT HAND: Primary | ICD-10-CM

## 2025-08-10 PROCEDURE — 10060 I&D ABSCESS SIMPLE/SINGLE: CPT

## 2025-08-10 PROCEDURE — 99283 EMERGENCY DEPT VISIT LOW MDM: CPT

## 2025-08-10 PROCEDURE — 6370000000 HC RX 637 (ALT 250 FOR IP): Performed by: STUDENT IN AN ORGANIZED HEALTH CARE EDUCATION/TRAINING PROGRAM

## 2025-08-10 RX ORDER — CEPHALEXIN 500 MG/1
500 CAPSULE ORAL ONCE
Status: COMPLETED | OUTPATIENT
Start: 2025-08-10 | End: 2025-08-10

## 2025-08-10 RX ORDER — CEPHALEXIN 500 MG/1
500 CAPSULE ORAL 4 TIMES DAILY
Qty: 40 CAPSULE | Refills: 0 | Status: SHIPPED | OUTPATIENT
Start: 2025-08-10 | End: 2025-08-20

## 2025-08-10 RX ADMIN — CEPHALEXIN 500 MG: 500 CAPSULE ORAL at 04:22

## 2025-08-10 ASSESSMENT — PAIN DESCRIPTION - DESCRIPTORS: DESCRIPTORS: ACHING;THROBBING

## 2025-08-10 ASSESSMENT — PAIN DESCRIPTION - LOCATION: LOCATION: FINGER (COMMENT WHICH ONE)

## 2025-08-10 ASSESSMENT — LIFESTYLE VARIABLES
HOW OFTEN DO YOU HAVE A DRINK CONTAINING ALCOHOL: NEVER
HOW MANY STANDARD DRINKS CONTAINING ALCOHOL DO YOU HAVE ON A TYPICAL DAY: PATIENT DOES NOT DRINK

## 2025-08-10 ASSESSMENT — PAIN DESCRIPTION - PAIN TYPE: TYPE: ACUTE PAIN

## 2025-08-10 ASSESSMENT — PAIN SCALES - GENERAL: PAINLEVEL_OUTOF10: 4

## 2025-08-10 ASSESSMENT — PAIN - FUNCTIONAL ASSESSMENT
PAIN_FUNCTIONAL_ASSESSMENT: 0-10
PAIN_FUNCTIONAL_ASSESSMENT: ACTIVITIES ARE NOT PREVENTED

## 2025-08-10 ASSESSMENT — PAIN DESCRIPTION - ORIENTATION: ORIENTATION: LEFT
